# Patient Record
Sex: MALE | NOT HISPANIC OR LATINO | Employment: FULL TIME | URBAN - METROPOLITAN AREA
[De-identification: names, ages, dates, MRNs, and addresses within clinical notes are randomized per-mention and may not be internally consistent; named-entity substitution may affect disease eponyms.]

---

## 2021-09-03 ENCOUNTER — OFFICE VISIT (OUTPATIENT)
Dept: ORTHOPEDIC SURGERY | Facility: CLINIC | Age: 36
End: 2021-09-03

## 2021-09-03 VITALS — WEIGHT: 225 LBS | BODY MASS INDEX: 30.48 KG/M2 | RESPIRATION RATE: 14 BRPM | HEIGHT: 72 IN

## 2021-09-03 DIAGNOSIS — S89.92XA INJURY OF LEFT KNEE, INITIAL ENCOUNTER: ICD-10-CM

## 2021-09-03 DIAGNOSIS — M25.562 LEFT KNEE PAIN, UNSPECIFIED CHRONICITY: Primary | ICD-10-CM

## 2021-09-03 PROCEDURE — 99203 OFFICE O/P NEW LOW 30 MIN: CPT | Performed by: ORTHOPAEDIC SURGERY

## 2021-09-03 NOTE — PROGRESS NOTES
"Chief Complaint  Pain of the Left Knee     Subjective      Delfino Figueroa presents to [facility] for evaluation of the left knee. The patient injured his left knee going down a slip and slide on a blowup dolphin about 7 weeks ago. He reports his knee swelled up after the injury. He reports it has improved but still has stiffness. He reports pain to the medial side of the knee. He denies any mechanical symptoms.     No Known Allergies     Social History     Socioeconomic History   • Marital status:      Spouse name: Not on file   • Number of children: Not on file   • Years of education: Not on file   • Highest education level: Not on file   Tobacco Use   • Smoking status: Never Smoker   • Smokeless tobacco: Never Used        Review of Systems     Objective   Vital Signs:   Resp 14   Ht 182.9 cm (72\")   Wt 102 kg (225 lb)   BMI 30.52 kg/m²       Physical Exam  Constitutional:       Appearance: Normal appearance. The patient is well-developed and normal weight.   HENT:      Head: Normocephalic.      Right Ear: Hearing and external ear normal.      Left Ear: Hearing and external ear normal.      Nose: Nose normal.   Eyes:      Conjunctiva/sclera: Conjunctivae normal.   Cardiovascular:      Rate and Rhythm: Normal rate.   Pulmonary:      Effort: Pulmonary effort is normal.      Breath sounds: No wheezing or rales.   Abdominal:      Palpations: Abdomen is soft.      Tenderness: There is no abdominal tenderness.   Musculoskeletal:      Cervical back: Normal range of motion.   Skin:     Findings: No rash.   Neurological:      Mental Status: The patient is alert and oriented to person, place, and time.   Psychiatric:         Mood and Affect: Mood and affect normal.         Judgment: Judgment normal.       Ortho Exam      Left knee- tender to medial joint line. ROM 0-135 degrees. Stable to varus/valgus stress. Stable to anterior/posterior drawer. Neurovascularly intact. Negative " Lachman's. Mild pain with Alice's.     Procedures    X-Ray Report:  Left knee(s) X-Ray  Indication: Evaluation of left knee pain  AP, Lateral, Standing and Sunrise view(s)  Findings: no effusion. No acute fracture, or dislocation. Joint space is preserved. ossicle at the tibial tubercle consistent with previous osgood schlatter    Prior studies available for comparison: no           Imaging Results (Most Recent)     Procedure Component Value Units Date/Time    XR Knee 3 View Left [[id number]] Resulted: 09/03/21 0912     Updated: 09/03/21 0915           Result Review :       No results found.           Assessment and Plan     DX: Left knee Injury    Discussed the treatment plan with the patient.  Plan for conservative treatment at this time. The patient wishes to hold off on an MRI at this time. He may consider this in the future to evaluate the meniscus. Plan for OTC Nsaids.     Call or return if worsening symptoms.    Follow Up     PRN      Patient was given instructions and counseling regarding his condition or for health maintenance advice. Please see specific information pulled into the AVS if appropriate.     Scribed for Doni Trevino MD by Cassy Powell.  09/03/21   09:19 EDT  I have personally performed the services described in this document as scribed by the above individual and it is both accurate and complete. Doni Trevino MD 09/06/21

## 2023-03-14 ENCOUNTER — OFFICE VISIT (OUTPATIENT)
Dept: ORTHOPEDIC SURGERY | Facility: CLINIC | Age: 38
End: 2023-03-14
Payer: COMMERCIAL

## 2023-03-14 VITALS — WEIGHT: 225 LBS | HEIGHT: 72 IN | HEART RATE: 65 BPM | BODY MASS INDEX: 30.48 KG/M2 | OXYGEN SATURATION: 98 %

## 2023-03-14 DIAGNOSIS — M25.511 RIGHT SHOULDER PAIN, UNSPECIFIED CHRONICITY: Primary | ICD-10-CM

## 2023-03-14 DIAGNOSIS — M19.019 OSTEOARTHRITIS OF AC (ACROMIOCLAVICULAR) JOINT: ICD-10-CM

## 2023-03-14 DIAGNOSIS — M75.101 TEAR OF RIGHT ROTATOR CUFF, UNSPECIFIED TEAR EXTENT, UNSPECIFIED WHETHER TRAUMATIC: ICD-10-CM

## 2023-03-14 DIAGNOSIS — M19.011 PRIMARY OSTEOARTHRITIS OF RIGHT SHOULDER: ICD-10-CM

## 2023-03-14 PROCEDURE — 99213 OFFICE O/P EST LOW 20 MIN: CPT | Performed by: ORTHOPAEDIC SURGERY

## 2023-03-14 PROCEDURE — 20610 DRAIN/INJ JOINT/BURSA W/O US: CPT | Performed by: ORTHOPAEDIC SURGERY

## 2023-03-14 RX ORDER — LIDOCAINE HYDROCHLORIDE 10 MG/ML
5 INJECTION, SOLUTION EPIDURAL; INFILTRATION; INTRACAUDAL; PERINEURAL
Status: COMPLETED | OUTPATIENT
Start: 2023-03-14 | End: 2023-03-14

## 2023-03-14 RX ORDER — DICLOFENAC SODIUM 75 MG/1
75 TABLET, DELAYED RELEASE ORAL 2 TIMES DAILY
Qty: 60 TABLET | Refills: 2 | Status: SHIPPED | OUTPATIENT
Start: 2023-03-14

## 2023-03-14 RX ORDER — METHYLPREDNISOLONE ACETATE 80 MG/ML
80 INJECTION, SUSPENSION INTRA-ARTICULAR; INTRALESIONAL; INTRAMUSCULAR; SOFT TISSUE
Status: COMPLETED | OUTPATIENT
Start: 2023-03-14 | End: 2023-03-14

## 2023-03-14 RX ADMIN — METHYLPREDNISOLONE ACETATE 80 MG: 80 INJECTION, SUSPENSION INTRA-ARTICULAR; INTRALESIONAL; INTRAMUSCULAR; SOFT TISSUE at 11:31

## 2023-03-14 RX ADMIN — LIDOCAINE HYDROCHLORIDE 5 ML: 10 INJECTION, SOLUTION EPIDURAL; INFILTRATION; INTRACAUDAL; PERINEURAL at 11:31

## 2023-03-14 NOTE — PROGRESS NOTES
"Chief Complaint   Initial Evaluation of the Right Shoulder     Subjective      Delfino Figueroa presents to [facility] for evaluation of the right shoulder. The patient reports right shoulder pain that started about 8 months ago. He reports the last 3 weeks it has started waking his up at night. He locates pain to the lateral shoulder. He takes ibuprofen without a lot of relief. He does a lot of heavy lifting and has pain with anything away from his body and overhead activity. He reports he played baseball when he was younger.     No Known Allergies     Social History     Socioeconomic History   • Marital status:    Tobacco Use   • Smoking status: Never   • Smokeless tobacco: Never        Review of Systems     Objective   Vital Signs:   Pulse 65   Ht 182.9 cm (72\")   Wt 102 kg (225 lb)   SpO2 98%   BMI 30.52 kg/m²       Physical Exam  Constitutional:       Appearance: Normal appearance. The patient is well-developed and normal weight.   HENT:      Head: Normocephalic.      Right Ear: Hearing and external ear normal.      Left Ear: Hearing and external ear normal.      Nose: Nose normal.   Eyes:      Conjunctiva/sclera: Conjunctivae normal.   Cardiovascular:      Rate and Rhythm: Normal rate.   Pulmonary:      Effort: Pulmonary effort is normal.      Breath sounds: No wheezing or rales.   Abdominal:      Palpations: Abdomen is soft.      Tenderness: There is no abdominal tenderness.   Musculoskeletal:      Cervical back: Normal range of motion.   Skin:     Findings: No rash.   Neurological:      Mental Status: The patient is alert and oriented to person, place, and time.   Psychiatric:         Mood and Affect: Mood and affect normal.         Judgment: Judgment normal.       Ortho Exam      Right shoulder- Forward elevation 175. Abduction 130. External Rotation 90. Internal rotation 40. Positive Neer and Aquino. 4+ supraspinatus strength, 5/5 infraspinatus and subscapularis. " Internal rotation to L-5. Neurovascularly intact. Mild pain with lift off. Negative O'cynthia and cross arm adduction. Sensation to light touch median, radial, ulnar nerve. Positive AIN, PIN, ulnar nerve motor function. Positive pulses.     Right shoulder  Date/Time: 3/14/2023 11:31 AM  Consent given by: patient  Site marked: site marked  Timeout: Immediately prior to procedure a time out was called to verify the correct patient, procedure, equipment, support staff and site/side marked as required   Supporting Documentation  Indications: pain   Procedure Details  Location: shoulder (Right shoulder) -   Needle gauge: 21G.  Medications administered: 5 mL lidocaine PF 1% 1 %; 80 mg methylPREDNISolone acetate 80 MG/ML  Patient tolerance: patient tolerated the procedure well with no immediate complications          X-Ray Report:  Right scapula X-Ray  Indication: Evaluation of right shoulder pain  AP/Lateral view(s)  Findings: mild degenerative changes to the acromioclavicular joint. Mild degenerative changes to the glenohumeral joint with osteophyte to humeral head.   Prior studies available for comparison: no         Imaging Results (Most Recent)     Procedure Component Value Units Date/Time    XR Scapula Right [[id number]] Resulted: 03/14/23 1100     Updated: 03/14/23 1101           Result Review :       No results found.           Assessment and Plan     Diagnoses and all orders for this visit:    1. Right shoulder pain, unspecified chronicity (Primary)  -     XR Scapula Right    2. Tear of right rotator cuff, unspecified tear extent, unspecified whether traumatic    3. Primary osteoarthritis of right shoulder    4. Osteoarthritis of AC (acromioclavicular) joint        Discussed the treatment plan with the patient.  I reviewed the x-rays that were obtained today with the patient. Plan for MRI of the right shoulder to evaluate for a rotator cuff tear. The patient expressed understanding and wished to proceed. Discussed  the risks and benefits of a right shoulder steroid injection. The patient expressed understanding and wished to proceed. He tolerated the injection well.     Call or return if worsening symptoms.    Follow Up     MRI results      Patient was given instructions and counseling regarding his condition or for health maintenance advice. Please see specific information pulled into the AVS if appropriate.     Scribed for Doni Trevino MD by Cassy Powell.  03/14/23   11:08 EDT      Answers for HPI/ROS submitted by the patient on 3/9/2023  What is the primary reason for your visit?: Other  Please describe your symptoms.: Right shoulder pain, continually getting worse. Can hear grinding occasionally when raising arm. Wakes me when sleeping.  Have you had these symptoms before?: No  How long have you been having these symptoms?: Greater than 2 weeks  Please list any medications you are currently taking for this condition.: None  Please describe any probable cause for these symptoms. : Baseball when younger, life heavy weights now    I have personally performed the services described in this document as scribed by the above individual and it is both accurate and complete. Doni Trevino MD 03/14/23

## 2023-06-18 DIAGNOSIS — M19.019 OSTEOARTHRITIS OF AC (ACROMIOCLAVICULAR) JOINT: ICD-10-CM

## 2023-06-18 DIAGNOSIS — M19.011 PRIMARY OSTEOARTHRITIS OF RIGHT SHOULDER: ICD-10-CM

## 2023-06-18 DIAGNOSIS — M25.511 RIGHT SHOULDER PAIN, UNSPECIFIED CHRONICITY: ICD-10-CM

## 2023-06-18 DIAGNOSIS — M75.101 TEAR OF RIGHT ROTATOR CUFF, UNSPECIFIED TEAR EXTENT, UNSPECIFIED WHETHER TRAUMATIC: ICD-10-CM

## 2023-06-19 RX ORDER — DICLOFENAC SODIUM 75 MG/1
TABLET, DELAYED RELEASE ORAL
Qty: 60 TABLET | Refills: 2 | Status: SHIPPED | OUTPATIENT
Start: 2023-06-19

## 2024-07-26 ENCOUNTER — OFFICE VISIT (OUTPATIENT)
Dept: UROLOGY | Facility: CLINIC | Age: 39
End: 2024-07-26
Payer: COMMERCIAL

## 2024-07-26 VITALS
TEMPERATURE: 98.4 F | DIASTOLIC BLOOD PRESSURE: 77 MMHG | HEART RATE: 73 BPM | HEIGHT: 72 IN | SYSTOLIC BLOOD PRESSURE: 121 MMHG | BODY MASS INDEX: 28.77 KG/M2 | WEIGHT: 212.4 LBS

## 2024-07-26 DIAGNOSIS — Z30.09 STERILIZATION CONSULT: Primary | ICD-10-CM

## 2024-07-26 NOTE — PROGRESS NOTES
"Chief Complaint  Sterilization (VAS. CONSULT)    Subjective          Delfino Figueroa 39 y.o. male who presents to [facility]  History of Present Illness  The patient is a consultation from self, for vasectomy counseling. Prior  surgeries have not been performed. Patient is  and has 2 biological children.     The patient is counseled regarding a no scalpel vasectomy. Key points are that it should be considered irreversible even though it can be reversed, there are risks including failure to achieve sterility, recanalization, bleeding, testicular swelling and/or pain, formation of a sperm granuloma and infection. Limitations of activity post-procedure were discussed and he understands that he is not sterile immediately following the procedure. He will need to bring a semen specimen back in about 6-8 weeks to confirm the abscence of sperm and needs to use contraception until then. Patient understands this is considered an irreversible procedure and wishes have performed. Denies any urological problems or bleeding disorders.     07/26/2024       Review of Systems   Constitutional:  Negative for chills and fever.   Genitourinary: Negative.    Hematological:  Does not bruise/bleed easily.      Objective   Vital Signs:   /77 (BP Location: Left arm, Patient Position: Sitting, Cuff Size: Large Adult)   Pulse 73   Temp 98.4 °F (36.9 °C) (Temporal)   Ht 182.9 cm (72\")   Wt 96.3 kg (212 lb 6.4 oz)   BMI 28.81 kg/m²     Physical Exam  Vitals and nursing note reviewed.   Constitutional:       General: He is not in acute distress.     Appearance: Normal appearance. He is well-developed. He is not ill-appearing.      Comments: Ambulates without difficulty   Cardiovascular:      Rate and Rhythm: Normal rate and regular rhythm.   Pulmonary:      Effort: Pulmonary effort is normal.      Breath sounds: Normal breath sounds and air entry.   Genitourinary:     Testes:         Right: " Tenderness not present.         Left: Tenderness not present.      Epididymis:      Right: No mass or tenderness.      Left: No mass or tenderness.   Musculoskeletal:         General: Normal range of motion.   Skin:     General: Skin is warm and dry.   Neurological:      Mental Status: He is alert and oriented to person, place, and time.      Motor: Motor function is intact.   Psychiatric:         Mood and Affect: Mood normal.         Behavior: Behavior normal. Behavior is cooperative.         Thought Content: Thought content normal.         Judgment: Judgment normal.        Result Review :                 Assessment and Plan    Diagnoses and all orders for this visit:    1. Sterilization consult (Primary)  -     Vasectomy; Future        Pre-op medication Valium 5mg x1 dose to be taken 30-45 minutes prior to Vasectomy. Medication is not required but is available if desired. Patient to call office several days prior to scheduled procedure for prescription to be sent into  pharmacy. Patient must have .    Availability of nitrous oxide Pronox is an elective option at cost to patient. Not required and brochure provided    All risks, benefits and alternatives to vasectomy discussed. Patient understanding that this is considered an irreversible procedure. Written consent obtained. Verbal and written information provided along with a copy of signed instructions.     Patient was given instructions and counseling regarding his condition or for health maintenance advice. Please see specific information pulled into the AVS if appropriate.     Instructions  The patient is to go immediately home and lie down flat on his back with an ice pack on the scrotum as verbally discussed.  He may shower in the morning but no immersion in water for 2 weeks. He is to avoid strenuous activity for 3 days total including day of procedure and  no straddle activity for 3 weeks. He is reminded that he is not yet sterile and must use  contraception until we confirm he no longer has sperm in a semen. Specimen to be brought to office after 26 ejaculations approximately in 6-8 weeks. Patient to call prior to specimen drop off to ensure provider MD Ana María will be in office to check sample. Patient to call office to be seen if any post procedure problems.  The patient will schedule a vasectomy as desired.  Handouts were provided  Electronically Identified Patient Education Materials provided.          Follow Up   Return for vasectomy as scheduled.  THERESA Junior

## 2024-08-05 ENCOUNTER — PROCEDURE VISIT (OUTPATIENT)
Dept: UROLOGY | Facility: CLINIC | Age: 39
End: 2024-08-05
Payer: COMMERCIAL

## 2024-08-05 DIAGNOSIS — Z30.2 STERILIZATION: Primary | ICD-10-CM

## 2024-08-05 DIAGNOSIS — Z30.09 STERILIZATION CONSULT: ICD-10-CM

## 2024-08-05 PROCEDURE — 55250 REMOVAL OF SPERM DUCT(S): CPT | Performed by: UROLOGY

## 2024-08-05 NOTE — PROGRESS NOTES
Patient was placed in lithotomy position.  Thorough scrubbing her lower abdomen and external genitalia was performed.  Patient was draped in a sterile fashion.  We used 1% Xylocaine with epinephrine  and injected in the midline of scrotum.  Chinese technique was used and a nick was made in the mid scrotum with the forceps.  Right vas was grasped with a clamp and using cautery it was isolated from the blood vessels and about 2.5 cm of the vas was removed and the edges were coagulated.    Same thing was done on the left side.  Hemostasis was acquired.  Dermabond was used to seal the incision.  We used 16 mL of local anesthesia.  Blood loss was less than 1 mL.  Patient tolerated procedure well and sent home in good condition.    We also used Pronox for local analgesia.

## 2025-01-02 ENCOUNTER — TELEPHONE (OUTPATIENT)
Dept: UROLOGY | Age: 40
End: 2025-01-02
Payer: COMMERCIAL

## 2025-01-02 NOTE — TELEPHONE ENCOUNTER
CALLED PT AND SCHEDULED NEW PT APPT FOR 1/7 @ 9:30. INFORMED PATIENT THIS IS ONLY FOR A SPECIMEN DROP.    PATIENT STATED HE HAS STERILE CUP FROM [facility], READ NIYAH'S  SPECIMEN COLLECTION INSTRUCTIONS TO NABIL AND HE VERBALIZED UNDERSTANDING.

## 2025-01-02 NOTE — TELEPHONE ENCOUNTER
PT HAD A VASECTOMY BY DR SAGASTUME IN AUGUST.HE NEVER FOLLOWED UP. HE IS NOT ESTABLISHED HERE BUT ASKED IF WE COULD PLACE AN ORDER AND LET HIM HAVE HIS SEMEN CHECKED

## 2025-01-07 ENCOUNTER — OFFICE VISIT (OUTPATIENT)
Dept: UROLOGY | Age: 40
End: 2025-01-07
Payer: COMMERCIAL

## 2025-01-07 ENCOUNTER — TELEPHONE (OUTPATIENT)
Dept: UROLOGY | Age: 40
End: 2025-01-07

## 2025-01-07 DIAGNOSIS — Z30.2 ENCOUNTER FOR STERILIZATION: Primary | ICD-10-CM

## 2025-01-07 PROCEDURE — 99024 POSTOP FOLLOW-UP VISIT: CPT | Performed by: UROLOGY

## 2025-01-07 NOTE — TELEPHONE ENCOUNTER
Hub staff attempted to follow warm transfer process and was unsuccessful     Caller: ADEEL ENCISO    Relationship to patient: SELF    Best call back number: [phone] (home)      Patient is needing: PT IS CALLING TO FIND OUT WHEN HE CAN COME IN TO DROP OFF A SAMPLE AS HE WAS NOT ABLE TO HAVE HIS APPT THIS MORNING. PLEASE CALL PT BACK TO DISCUSS. THANK YOU.

## 2025-02-04 ENCOUNTER — OFFICE VISIT (OUTPATIENT)
Dept: UROLOGY | Age: 40
End: 2025-02-04
Payer: COMMERCIAL

## 2025-02-04 DIAGNOSIS — Z30.2 ENCOUNTER FOR STERILIZATION: Primary | ICD-10-CM

## 2025-02-04 PROCEDURE — 99024 POSTOP FOLLOW-UP VISIT: CPT | Performed by: UROLOGY

## 2025-02-04 NOTE — PROGRESS NOTES
Subjective         History of Present Illness:  Delfino Figueroa is a 39 y.o. male who is here status post vasectomy. 0 sperm noted on a #20 power fields.         Assessment and Plan     Undesired fertility    Medications  Medications have been reconciled.       Instructions    [x]   Instructed patient to follow-up as needed, counseled that he is okay to stop using birth control.    []   Patient to follow-up in 1 month for recheck, patient counseled to continue use birth control as he is still considered fertile and able to father children until recheck and given the okay to stop using birth control at that time. Patient voiced understanding.          Electronically Signed by: Ric Santos MD on 02/04/25